# Patient Record
(demographics unavailable — no encounter records)

---

## 2024-11-22 NOTE — HISTORY OF PRESENT ILLNESS
[FreeTextEntry1] : Reason For Visit---stable chronic HIV management and stable elevated lipids --SWETHA STEPHENSON is a pleasant 34 year old male being seen for a HIV follow-up visit. Taking Cabenuva injection for HIV management . Med Hx: HIV, elevated cholesterol, psoriasis, former smoker 1/2 pack a day...quit in early Jan 2023 and has not smoked since a year ago . His mother was a smoker and passed away from lung cancer , Diagnosed with HIV in 2010. Virally suppressed on Odefsey now on Cabenuva. No family Hx of Colon Ca. All labs reviewed. Review of Systems received the J & J vaccine Plaque Psorisis  Plan :----stable chronic HIV management and stable elevated lipids 1) continue Cabenuva 600& 900mg/3ml every 2 months for HIV management ( reviewed and renewed medication) 2) follow up with Dr. angelic Benson for depression---pt doing very well. 3) baseline chest x ray ( former smoker) and needs a PCP ( referral given ) 4) reviewed all labs today and new labs including CBC, BMP. tsh, urine, cd4, Vload, G & C urine, 5) see in person in 6 months 6) may start statin after review of new lipid panel  Feeling well overall  Stressed due to mother's medical issues over the last few months, was in ICU  c/o weight gain and perceived hair loss.  Not exercising.  Didn't start humira yet  Cut down to 6-7 day cigarettes  Fasting today  Takes multivitamin daily. fish oil occasionally.    Sexual History: MSM, denies any partners since last summer.    The patient is sexually active and is not monogamous. The patient is using condoms for every encounter. He is currently sexually active with male partner(s).  Occupation: manager at [id number]. Considering different job, unsure what.  Lives with family.      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.4)  History of CD4 count (V15.89) (Z92.89)  HIV disease (042) (B20)  Need for immunization against influenza (V04.81) (Z23)  Penile wart (078.11) (A63.0)    Past Medical History  History of Abscess, neck (682.1) (L02.11)  History of Adjustment disorder (309.9) (F43.20)  History of Anxiety and depression (300.00,311) (F41.9,F32.9)  History of Cellulitis, face (682.0) (L03.211)  History of Hepatitis B antibody positive (795.79) (R76.8)  History of conjunctivitis (V12.49) (Z86.69)  History of contact dermatitis (V13.3) (Z87.2)  History of furuncle (V13.3) (Z87.2)  History of pityriasis rosea (V13.3) (Z87.2)  History of psoriasis (V13.3) (Z87.2)  History of viral infection (V12.09) (Z86.19)  History of vitamin D deficiency (V12.1) (Z86.39)  History of Pharyngotonsillitis (465.8) (J03.90,J02.9)  History of Positive CMV IgG serology (795.79) (R76.8)  History of Tinea barbae (110.0) (B35.0)  History of Visit for dental examination (V72.2) (Z01.20)  History of Visit for eye and vision exam (V72.0) (Z01.00)    Current Meds  Imiquimod 5 % External Cream; APPLY TO AFFECTED AREAS THREE TIMES WEEKLY.  WASH OFF AFTER 6-10 HOURS  Multi-Vitamin TABS; TAKE 1 TABLET DAILY  Mupirocin 2 % External Ointment; apply to affected area three times daily  Odefsey 200-25-25 MG Oral Tablet; Take 1 tablet by mouth daily with a meal    Allergies  Ziagen TABS

## 2024-11-22 NOTE — ASSESSMENT
[FreeTextEntry1] : Plan :----stable chronic HIV management and stable elevated lipids 1) continue Cabenuva 600& 900mg/3ml every 2 months for HIV management ( reviewed and renewed medication) 2) follow up with Dr. angelic Benson for depression---pt doing very well. 3) baseline chest x ray ( former smoker) and needs a PCP ( referral given ) 4) reviewed all labs today and new labs including CBC, BMP. tsh, urine, cd4, Vload, G & C urine, 5) see in person in 6 months 6) may start statin after review of new lipid panel

## 2025-05-02 NOTE — REVIEW OF SYSTEMS
1) Although your TSH was over 3, you last 2 values have been under 2 so I recommend we stay on 6.5 tabs/week of levoxyl and repeat your labs in 2/21 and make sure you let me know the results and let me know when you need a refill. 2) If you lose 10 or more lbs over the coming months, please watch out for any symptoms of hyperthyroidism that would suggest your dose is too much such as chest pain, heart racing, anxiety, tremors, trouble sleeping, feeling hot all the time, hair loss, or diarrhea. If they occur, let me know and we can check your labs sooner to see if a dose decrease is needed. 3) Please come for a follow up visit on 1/11/22 at 3:30pm in our [place] office. 4) I put an order directly into the "[employer] system to repeat your labs in the 1-2 weeks prior to your next visit so just ask for the order under my name and you will receive a courtesy reminder through [employer] to have these drawn. [Negative] : Heme/Lymph

## 2025-05-02 NOTE — ASSESSMENT
[FreeTextEntry1] : Plan :----stable chronic HIV management and stable elevated lipids and fatty liver 1) continue Cabenuva 600& 900mg/3ml every 2 months for HIV management ( reviewed and renewed medication) 2) follow up with Dr. angelic Benson for depression---pt doing very well. 3) baseline chest x ray ( former smoker) and needs a PCP ( referral given ) 4) reviewed all labs today and new labs including CBC, BMP. tsh, urine, cd4, Vload, G & C urine, 5) see in person in 6 months 6) may start statin after review of new lipid panel [Treatment Education] : treatment education [Treatment Adherence] : treatment adherence [Drug Interactions / Side Effects] : drug interactions/side effects [HIV Education] : HIV Education [Anticipatory Guidance] : anticipatory guidance

## 2025-05-02 NOTE — HISTORY OF PRESENT ILLNESS
[FreeTextEntry1] : Reason For Visit---stable chronic HIV management and stable elevated lipids --SWETHA STEPHENSON is a pleasant 35 year old male being seen for a HIV follow-up visit. Taking Cabenuva injection for HIV management. Med Hx: HIV, elevated cholesterol, psoriasis, former smoker 1/2 pack a day...quit in early Jan 2023 and has not smoked since a year ago . His mother was a smoker and passed away from lung cancer , Diagnosed with HIV in 2010. Virally suppressed on Odefsey now on Cabenuva. No family Hx of Colon Ca. All labs reviewed. Review of Systems received the J & J vaccine Plaque Psorisis  Plan :----stable chronic HIV management and stable elevated lipids and fatty liver 1) continue Cabenuva 600& 900mg/3ml every 2 months for HIV management ( reviewed and renewed medication) 2) follow up with Dr. angelic Benson for depression---pt doing very well. 3) baseline chest x ray ( former smoker) and needs a PCP ( referral given ) 4) reviewed all labs today and new labs including CBC, BMP. tsh, urine, cd4, Vload, G & C urine, 5) see in person in 6 months 6) may start statin after review of new lipid panel  Feeling well overall  Stressed due to mother's medical issues over the last few months, was in ICU  c/o weight gain and perceived hair loss.  Not exercising.  Didn't start humira yet  Cut down to 6-7 day cigarettes  Fasting today  Takes multivitamin daily. fish oil occasionally.    Sexual History: MSM, denies any partners since last summer.    The patient is sexually active and is not monogamous. The patient is using condoms for every encounter. He is currently sexually active with male partner(s).  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  Lives with family.      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.4)  History of CD4 count (V15.89) (Z92.89)  HIV disease (042) (B20)  Need for immunization against influenza (V04.81) (Z23)  Penile wart (078.11) (A63.0)    Past Medical History  History of Abscess, neck (682.1) (L02.11)  History of Adjustment disorder (309.9) (F43.20)  History of Anxiety and depression (300.00,311) (F41.9,F32.9)  History of Cellulitis, face (682.0) (L03.211)  History of Hepatitis B antibody positive (795.79) (R76.8)  History of conjunctivitis (V12.49) (Z86.69)  History of contact dermatitis (V13.3) (Z87.2)  History of furuncle (V13.3) (Z87.2)  History of pityriasis rosea (V13.3) (Z87.2)  History of psoriasis (V13.3) (Z87.2)  History of viral infection (V12.09) (Z86.19)  History of vitamin D deficiency (V12.1) (Z86.39)  History of Pharyngotonsillitis (465.8) (J03.90,J02.9)  History of Positive CMV IgG serology (795.79) (R76.8)  History of Tinea barbae (110.0) (B35.0)  History of Visit for dental examination (V72.2) (Z01.20)  History of Visit for eye and vision exam (V72.0) (Z01.00)    Current Meds  Imiquimod 5 % External Cream; APPLY TO AFFECTED AREAS THREE TIMES WEEKLY.  WASH OFF AFTER 6-10 HOURS  Multi-Vitamin TABS; TAKE 1 TABLET DAILY  Mupirocin 2 % External Ointment; apply to affected area three times daily  Odefsey 200-25-25 MG Oral Tablet; Take 1 tablet by mouth daily with a meal    Allergies  Ziagen TABS